# Patient Record
Sex: FEMALE | Race: WHITE
[De-identification: names, ages, dates, MRNs, and addresses within clinical notes are randomized per-mention and may not be internally consistent; named-entity substitution may affect disease eponyms.]

---

## 2018-10-17 ENCOUNTER — HOSPITAL ENCOUNTER (EMERGENCY)
Dept: HOSPITAL 76 - ED | Age: 58
Discharge: HOME | End: 2018-10-17
Payer: COMMERCIAL

## 2018-10-17 VITALS — SYSTOLIC BLOOD PRESSURE: 103 MMHG | DIASTOLIC BLOOD PRESSURE: 63 MMHG

## 2018-10-17 DIAGNOSIS — E78.00: ICD-10-CM

## 2018-10-17 DIAGNOSIS — J18.1: ICD-10-CM

## 2018-10-17 DIAGNOSIS — E86.0: ICD-10-CM

## 2018-10-17 DIAGNOSIS — I10: ICD-10-CM

## 2018-10-17 DIAGNOSIS — N30.00: Primary | ICD-10-CM

## 2018-10-17 LAB
ALBUMIN DIAFP-MCNC: 4.2 G/DL (ref 3.2–5.5)
ALBUMIN/GLOB SERPL: 1.4 {RATIO} (ref 1–2.2)
ALP SERPL-CCNC: 94 IU/L (ref 42–121)
ALT SERPL W P-5'-P-CCNC: 23 IU/L (ref 10–60)
ANION GAP SERPL CALCULATED.4IONS-SCNC: 9 MMOL/L (ref 6–13)
AST SERPL W P-5'-P-CCNC: 24 IU/L (ref 10–42)
BASOPHILS NFR BLD AUTO: 0.1 10^3/UL (ref 0–0.1)
BASOPHILS NFR BLD AUTO: 0.5 %
BILIRUB BLD-MCNC: 1.1 MG/DL (ref 0.2–1)
BUN SERPL-MCNC: 23 MG/DL (ref 6–20)
CALCIUM UR-MCNC: 9 MG/DL (ref 8.5–10.3)
CHLORIDE SERPL-SCNC: 103 MMOL/L (ref 101–111)
CLARITY UR REFRACT.AUTO: CLEAR
CO2 SERPL-SCNC: 26 MMOL/L (ref 21–32)
CREAT SERPLBLD-SCNC: 0.6 MG/DL (ref 0.4–1)
EOSINOPHIL # BLD AUTO: 0 10^3/UL (ref 0–0.7)
EOSINOPHIL NFR BLD AUTO: 0.3 %
ERYTHROCYTE [DISTWIDTH] IN BLOOD BY AUTOMATED COUNT: 13.6 % (ref 12–15)
GFRSERPLBLD MDRD-ARVRAT: 102 ML/MIN/{1.73_M2} (ref 89–?)
GLOBULIN SER-MCNC: 3 G/DL (ref 2.1–4.2)
GLUCOSE SERPL-MCNC: 118 MG/DL (ref 70–100)
GLUCOSE UR QL STRIP.AUTO: NEGATIVE MG/DL
HGB UR QL STRIP: 13.8 G/DL (ref 12–16)
KETONES UR QL STRIP.AUTO: NEGATIVE MG/DL
LIPASE SERPL-CCNC: 29 U/L (ref 22–51)
LYMPHOCYTES # SPEC AUTO: 0.3 10^3/UL (ref 1.5–3.5)
LYMPHOCYTES NFR BLD AUTO: 2 %
MCH RBC QN AUTO: 29.1 PG (ref 27–31)
MCHC RBC AUTO-ENTMCNC: 34 G/DL (ref 32–36)
MCV RBC AUTO: 85.6 FL (ref 81–99)
MONOCYTES # BLD AUTO: 0.2 10^3/UL (ref 0–1)
MONOCYTES NFR BLD AUTO: 1.3 %
MUCOUS THREADS URNS QL MICRO: (no result)
NEUTROPHILS # BLD AUTO: 12.2 10^3/UL (ref 1.5–6.6)
NEUTROPHILS # SNV AUTO: 12.7 X10^3/UL (ref 4.8–10.8)
NEUTROPHILS NFR BLD AUTO: 95.9 %
NITRITE UR QL STRIP.AUTO: POSITIVE
PDW BLD AUTO: 9.7 FL (ref 7.9–10.8)
PH UR STRIP.AUTO: 8 PH (ref 5–7.5)
PLAT MORPH BLD: (no result)
PLATELET # BLD: 197 10^3/UL (ref 130–450)
PROT SPEC-MCNC: 7.2 G/DL (ref 6.7–8.2)
PROT UR STRIP.AUTO-MCNC: NEGATIVE MG/DL
RBC # UR STRIP.AUTO: (no result) /UL
RBC # URNS HPF: (no result) /HPF (ref 0–5)
RBC MAR: 4.74 10^6/UL (ref 4.2–5.4)
SODIUM SERPLBLD-SCNC: 138 MMOL/L (ref 135–145)
SP GR UR STRIP.AUTO: 1.02 (ref 1–1.03)
SQUAMOUS URNS QL MICRO: (no result)
UROBILINOGEN UR QL STRIP.AUTO: (no result) E.U./DL
UROBILINOGEN UR STRIP.AUTO-MCNC: NEGATIVE MG/DL

## 2018-10-17 PROCEDURE — 83605 ASSAY OF LACTIC ACID: CPT

## 2018-10-17 PROCEDURE — 85025 COMPLETE CBC W/AUTO DIFF WBC: CPT

## 2018-10-17 PROCEDURE — 36415 COLL VENOUS BLD VENIPUNCTURE: CPT

## 2018-10-17 PROCEDURE — 83690 ASSAY OF LIPASE: CPT

## 2018-10-17 PROCEDURE — 81001 URINALYSIS AUTO W/SCOPE: CPT

## 2018-10-17 PROCEDURE — 81003 URINALYSIS AUTO W/O SCOPE: CPT

## 2018-10-17 PROCEDURE — 87086 URINE CULTURE/COLONY COUNT: CPT

## 2018-10-17 PROCEDURE — 71046 X-RAY EXAM CHEST 2 VIEWS: CPT

## 2018-10-17 PROCEDURE — 80053 COMPREHEN METABOLIC PANEL: CPT

## 2018-10-17 PROCEDURE — 87181 SC STD AGAR DILUTION PER AGT: CPT

## 2018-10-17 PROCEDURE — 87077 CULTURE AEROBIC IDENTIFY: CPT

## 2018-10-17 PROCEDURE — 99283 EMERGENCY DEPT VISIT LOW MDM: CPT

## 2018-10-17 PROCEDURE — 87040 BLOOD CULTURE FOR BACTERIA: CPT

## 2018-10-17 NOTE — ED PHYSICIAN DOCUMENTATION
PD HPI NVD





- Stated complaint


Stated Complaint: N/V CHILLS FEVER





- Chief complaint


Chief Complaint: Abd Pain





- History obtained from


History obtained from: Patient, Family





- History of Present Illness


Timing - onset: Today


Timing - duration: Hours


Timing - details: Abrupt onset, Still present


Associated symptoms: Fever, Dizzy, Near syncope / syncope, Loss of appetite


Improved by: Laying still


Similar symptoms before: Has not had sx before


Recently seen: Not recently seen





- Additonal information


Additional information: 





58-year-old female was well when she went to bed last night and when she woke 

this morning.  She did not have breakfast which is usual for her.  She is farm 

sitting right now and she was out tending to the animals when she had sudden 

onset of shaking chills and now has muscle aches and nausea and vomiting.  She 

has low-grade fever.  She denies any more cough than her usual morning cough 

which she coughed up a small amount of phlegm.  She denies any swelling or areas

of pain.





Review of Systems


Constitutional: reports: Fever, Chills, Myalgias, Fatigue, Sweats


Eyes: denies: Decreased vision


Ears: denies: Ear pain


Nose: denies: Rhinorrhea / runny nose, Congestion


Throat: denies: Sore throat


Cardiac: denies: Chest pain / pressure, Palpitations


Respiratory: reports: Cough.  denies: Dyspnea


GI: reports: Nausea, Vomiting.  denies: Abdominal Pain


: denies: Dysuria, Frequency


Skin: denies: Rash


Musculoskeletal: denies: Neck pain, Back pain, Extremity pain


Neurologic: reports: Generalized weakness.  denies: Focal weakness, Numbness





PD PAST MEDICAL HISTORY





- Past Medical History


Past Medical History: Yes


Cardiovascular: Hypertension, High cholesterol, Other


Respiratory: Pneumonia


GI: GERD, Other


: Chronic bladder infection, Kidney stones


Psych: Depression, Anxiety


Other Past Medical History: IBS





- Past Surgical History


Past Surgical History: Yes


General: Appendectomy


/GYN:  section, Tubal ligation





- Present Medications


Home Medications: 


                                Ambulatory Orders











 Medication  Instructions  Recorded  Confirmed


 


Levofloxacin [Levaquin] 500 mg PO DAILY #10 tablet 10/17/18 














- Allergies


Allergies/Adverse Reactions: 


                                    Allergies











Allergy/AdvReac Type Severity Reaction Status Date / Time


 


adhesive Allergy  Hives Verified 10/17/18 11:32














- Social History


Does the pt smoke?: No


Smoking Status: Never smoker


Does the pt drink ETOH?: No


Does the pt have substance abuse?: No





- Immunizations


Immunizations are current?: Yes





- POLST


Patient has POLST: No





PD ED PE NORMAL





- Vitals


Vital signs reviewed: Yes (tachy and hypertensive )





- General


General: Alert and oriented X 3, No acute distress, Well developed/nourished





- HEENT


HEENT: Atraumatic, PERRL, EOMI, Ears normal





- Neck


Neck: Supple, no meningeal sign, No bony TTP





- Cardiac


Cardiac: No murmur, Other (tachy to 110)





- Respiratory


Respiratory: No respiratory distress, Clear bilaterally





- Abdomen


Abdomen: Soft, Non tender





- Back


Back: No CVA TTP, No spinal TTP





- Derm


Derm: Normal color, Warm and dry, No rash





- Extremities


Extremities: No deformity, No edema





- Neuro


Neuro: Alert and oriented X 3, CNs 2-12 intact, No motor deficit, No sensory 

deficit, Normal speech


Eye Opening: Spontaneous


Motor: Obeys Commands


Verbal: Oriented


GCS Score: 15





- Psych


Psych: Normal mood, Normal affect





Results





- Vitals


Vitals: 


                               Vital Signs - 24 hr











  10/17/18 10/17/18 10/17/18





  11:30 15:06 15:23


 


Temperature 37.0 C 38.1 C H 


 


Heart Rate 107 H 93 


 


Respiratory 16 18 





Rate   


 


Blood Pressure 136/75 H 103/63 


 


O2 Saturation 97 94 97








                                     Oxygen











O2 Source                      Room air

















- Labs


Labs: 


                                Laboratory Tests











  10/17/18 10/17/18 10/17/18





  12:10 12:10 12:15


 


WBC  12.7 H  


 


RBC  4.74  


 


Hgb  13.8  


 


Hct  40.6  


 


MCV  85.6  


 


MCH  29.1  


 


MCHC  34.0  


 


RDW  13.6  


 


Plt Count  197  


 


MPV  9.7  


 


Neut # (Auto)  12.2 H  


 


Lymph # (Auto)  0.3 L  


 


Mono # (Auto)  0.2  


 


Eos # (Auto)  0.0  


 


Baso # (Auto)  0.1  


 


Absolute Nucleated RBC  0.01  


 


Total Counted  NP  


 


Band Neuts % (Manual)  NP  


 


Abnorm Lymph % (Manual)  NP  


 


Nucleated RBC %  0.1  


 


Neutrophils # (Manual)  NP  


 


Lymphocytes # (Manual)  NP  


 


Monocytes # (Manual)  NP  


 


Eosinophils # (Manual)  NP  


 


Basophils # (Manual)  NP  


 


Platelet Morphology  A  


 


Sodium   138 


 


Potassium   3.1 L 


 


Chloride   103 


 


Carbon Dioxide   26 


 


Anion Gap   9.0 


 


BUN   23 H 


 


Creatinine   0.6 


 


Estimated GFR (MDRD)   102 


 


Glucose   118 H 


 


Lactic Acid    2.0


 


Calcium   9.0 


 


Total Bilirubin   1.1 H 


 


AST   24 


 


ALT   23 


 


Alkaline Phosphatase   94 


 


Total Protein   7.2 


 


Albumin   4.2 


 


Globulin   3.0 


 


Albumin/Globulin Ratio   1.4 


 


Lipase   29 


 


Urine Color   


 


Urine Clarity   


 


Urine pH   


 


Ur Specific Gravity   


 


Urine Protein   


 


Urine Glucose (UA)   


 


Urine Ketones   


 


Urine Occult Blood   


 


Urine Nitrite   


 


Urine Bilirubin   


 


Urine Urobilinogen   


 


Ur Leukocyte Esterase   


 


Urine RBC   


 


Urine WBC   


 


Ur Squamous Epith Cells   


 


Urine Bacteria   


 


Urine Mucus   


 


Ur Microscopic Review   


 


Urine Culture Comments   














  10/17/18





  12:21


 


WBC 


 


RBC 


 


Hgb 


 


Hct 


 


MCV 


 


MCH 


 


MCHC 


 


RDW 


 


Plt Count 


 


MPV 


 


Neut # (Auto) 


 


Lymph # (Auto) 


 


Mono # (Auto) 


 


Eos # (Auto) 


 


Baso # (Auto) 


 


Absolute Nucleated RBC 


 


Total Counted 


 


Band Neuts % (Manual) 


 


Abnorm Lymph % (Manual) 


 


Nucleated RBC % 


 


Neutrophils # (Manual) 


 


Lymphocytes # (Manual) 


 


Monocytes # (Manual) 


 


Eosinophils # (Manual) 


 


Basophils # (Manual) 


 


Platelet Morphology 


 


Sodium 


 


Potassium 


 


Chloride 


 


Carbon Dioxide 


 


Anion Gap 


 


BUN 


 


Creatinine 


 


Estimated GFR (MDRD) 


 


Glucose 


 


Lactic Acid 


 


Calcium 


 


Total Bilirubin 


 


AST 


 


ALT 


 


Alkaline Phosphatase 


 


Total Protein 


 


Albumin 


 


Globulin 


 


Albumin/Globulin Ratio 


 


Lipase 


 


Urine Color  YELLOW


 


Urine Clarity  CLEAR


 


Urine pH  8.0 H


 


Ur Specific Gravity  1.020


 


Urine Protein  NEGATIVE


 


Urine Glucose (UA)  NEGATIVE


 


Urine Ketones  NEGATIVE


 


Urine Occult Blood  TRACE-INTA


 


Urine Nitrite  POSITIVE H


 


Urine Bilirubin  NEGATIVE


 


Urine Urobilinogen  0.2 (NORMAL)


 


Ur Leukocyte Esterase  SMALL H


 


Urine RBC  0-5


 


Urine WBC  >25 H


 


Ur Squamous Epith Cells  RARE Squamous


 


Urine Bacteria  Moderate H


 


Urine Mucus  Few Strands


 


Ur Microscopic Review  INDICATED


 


Urine Culture Comments  INDICATED














- Rads (name of study)


  ** 2 veiw chest


Radiology: Prelim report reviewed (Impression: Mild nodular right lung base 

opacities could represent bronchopneumonia.  Probable scar or atelectasis in the

 lingula), EMP read indepedently, See rad report





Procedures





- IVC sono (time)


  ** 1150


Bedside IVC sono: IVC measures (cm) (1.14), IVC collapsed c insp (cm) 

(complete), Dehydration (est 1liter deficit)





PD MEDICAL DECISION MAKING





- ED course


Complexity details: reviewed old records, reviewed results, re-evaluated 

patient, considered differential, d/w patient, d/w family


ED course: 





57 y/o female with acute febrile illness has evidence of infection on plain film

 of the chest was well as exam of the urine under the microscope. She is treated

 in the ED with IV saline and rocephin and is given potassium for a K+ of 3.1. 





Departure





- Departure


Disposition: 01 Home, Self Care


Clinical Impression: 


Urinary tract infection


Qualifiers:


 Urinary tract infection type: acute cystitis Hematuria presence: without 

hematuria Qualified Code(s): N30.00 - Acute cystitis without hematuria





Pneumonia


Qualifiers:


 Pneumonia type: due to unspecified organism Laterality: right Lung location: 

lower lobe of lung Qualified Code(s): J18.1 - Lobar pneumonia, unspecified 

organism





Condition: Stable


Instructions:  ED UTI Cystitis Female, ED Pneumonia Adult


Follow-Up: 


Ayanna Das PA [Primary Care Provider] - 


Prescriptions: 


Levofloxacin [Levaquin] 500 mg PO DAILY #10 tablet

## 2018-10-17 NOTE — XRAY REPORT
Reason:  cough fever chills

Procedure Date:  10/17/2018   

Accession Number:  [id number] / [id number]                    

Procedure:  XR  - Chest 2 View X-Ray CPT Code:  16721

 

FULL RESULT:

 

 

EXAM:

CHEST RADIOGRAPHY

 

EXAM DATE: 10/17/2018 01:16 PM.

 

CLINICAL HISTORY: Cough fever chills.

 

COMPARISON: None.

 

TECHNIQUE: 2 views.

 

FINDINGS:

Lungs/Pleura: Mild nodular opacities in the right lung base, likely in 

the right lower lobe. Probable streaky atelectasis or scar in the 

lingula. No pleural effusion. No pneumothorax. Borderline hyperinflation.

 

Mediastinum: Heart and mediastinal contours are normal.

 

Other: None.

IMPRESSION: Mild nodular right lung base opacities could represent 

bronchopneumonia. Probable scar or atelectasis in the lingula.

 

RADIA

## 2018-10-18 ENCOUNTER — HOSPITAL ENCOUNTER (INPATIENT)
Dept: HOSPITAL 76 - ED | Age: 58
LOS: 3 days | Discharge: HOME | DRG: 871 | End: 2018-10-21
Attending: INTERNAL MEDICINE | Admitting: INTERNAL MEDICINE
Payer: COMMERCIAL

## 2018-10-18 DIAGNOSIS — Z79.1: ICD-10-CM

## 2018-10-18 DIAGNOSIS — G89.29: ICD-10-CM

## 2018-10-18 DIAGNOSIS — M54.9: ICD-10-CM

## 2018-10-18 DIAGNOSIS — T36.95XA: ICD-10-CM

## 2018-10-18 DIAGNOSIS — G47.33: ICD-10-CM

## 2018-10-18 DIAGNOSIS — K52.1: ICD-10-CM

## 2018-10-18 DIAGNOSIS — R78.81: Primary | ICD-10-CM

## 2018-10-18 DIAGNOSIS — Z79.899: ICD-10-CM

## 2018-10-18 DIAGNOSIS — J18.1: ICD-10-CM

## 2018-10-18 DIAGNOSIS — N10: ICD-10-CM
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
## 2018-10-18 DIAGNOSIS — N20.0: ICD-10-CM

## 2018-10-18 DIAGNOSIS — B96.1: ICD-10-CM

## 2018-10-18 DIAGNOSIS — E87.6: ICD-10-CM

## 2018-10-18 DIAGNOSIS — I10: ICD-10-CM

## 2018-10-18 DIAGNOSIS — N30.00: ICD-10-CM

## 2018-10-18 DIAGNOSIS — E78.5: ICD-10-CM

## 2018-10-18 DIAGNOSIS — E86.0: ICD-10-CM

## 2018-10-18 LAB
ALBUMIN DIAFP-MCNC: 3.4 G/DL (ref 3.2–5.5)
ALBUMIN/GLOB SERPL: 1.3 {RATIO} (ref 1–2.2)
ALP SERPL-CCNC: 73 IU/L (ref 42–121)
ALT SERPL W P-5'-P-CCNC: 24 IU/L (ref 10–60)
ANION GAP SERPL CALCULATED.4IONS-SCNC: 8 MMOL/L (ref 6–13)
AST SERPL W P-5'-P-CCNC: 25 IU/L (ref 10–42)
BASOPHILS NFR BLD AUTO: 0.1 10^3/UL (ref 0–0.1)
BASOPHILS NFR BLD AUTO: 0.7 %
BILIRUB BLD-MCNC: 0.7 MG/DL (ref 0.2–1)
BUN SERPL-MCNC: 16 MG/DL (ref 6–20)
CALCIUM UR-MCNC: 8.5 MG/DL (ref 8.5–10.3)
CHLORIDE SERPL-SCNC: 106 MMOL/L (ref 101–111)
CO2 SERPL-SCNC: 25 MMOL/L (ref 21–32)
CREAT SERPLBLD-SCNC: 0.5 MG/DL (ref 0.4–1)
EOSINOPHIL # BLD AUTO: 0 10^3/UL (ref 0–0.7)
EOSINOPHIL NFR BLD AUTO: 0.3 %
ERYTHROCYTE [DISTWIDTH] IN BLOOD BY AUTOMATED COUNT: 13.9 % (ref 12–15)
GFRSERPLBLD MDRD-ARVRAT: 126 ML/MIN/{1.73_M2} (ref 89–?)
GLOBULIN SER-MCNC: 2.7 G/DL (ref 2.1–4.2)
GLUCOSE SERPL-MCNC: 110 MG/DL (ref 70–100)
HGB UR QL STRIP: 12.3 G/DL (ref 12–16)
LYMPHOCYTES # SPEC AUTO: 0.4 10^3/UL (ref 1.5–3.5)
LYMPHOCYTES NFR BLD AUTO: 4.9 %
MCH RBC QN AUTO: 29.8 PG (ref 27–31)
MCHC RBC AUTO-ENTMCNC: 34.4 G/DL (ref 32–36)
MCV RBC AUTO: 86.5 FL (ref 81–99)
MONOCYTES # BLD AUTO: 0.4 10^3/UL (ref 0–1)
MONOCYTES NFR BLD AUTO: 4.8 %
NEUTROPHILS # BLD AUTO: 7.3 10^3/UL (ref 1.5–6.6)
NEUTROPHILS # SNV AUTO: 8.2 X10^3/UL (ref 4.8–10.8)
NEUTROPHILS NFR BLD AUTO: 89.3 %
PDW BLD AUTO: 9.2 FL (ref 7.9–10.8)
PLATELET # BLD: 141 10^3/UL (ref 130–450)
PROT SPEC-MCNC: 6.1 G/DL (ref 6.7–8.2)
RBC MAR: 4.13 10^6/UL (ref 4.2–5.4)
SODIUM SERPLBLD-SCNC: 139 MMOL/L (ref 135–145)

## 2018-10-18 PROCEDURE — 87181 SC STD AGAR DILUTION PER AGT: CPT

## 2018-10-18 PROCEDURE — 87150 DNA/RNA AMPLIFIED PROBE: CPT

## 2018-10-18 PROCEDURE — 87077 CULTURE AEROBIC IDENTIFY: CPT

## 2018-10-18 PROCEDURE — 83690 ASSAY OF LIPASE: CPT

## 2018-10-18 PROCEDURE — 85025 COMPLETE CBC W/AUTO DIFF WBC: CPT

## 2018-10-18 PROCEDURE — 99283 EMERGENCY DEPT VISIT LOW MDM: CPT

## 2018-10-18 PROCEDURE — 96365 THER/PROPH/DIAG IV INF INIT: CPT

## 2018-10-18 PROCEDURE — 71046 X-RAY EXAM CHEST 2 VIEWS: CPT

## 2018-10-18 PROCEDURE — 99284 EMERGENCY DEPT VISIT MOD MDM: CPT

## 2018-10-18 PROCEDURE — 83605 ASSAY OF LACTIC ACID: CPT

## 2018-10-18 PROCEDURE — 71045 X-RAY EXAM CHEST 1 VIEW: CPT

## 2018-10-18 PROCEDURE — 36415 COLL VENOUS BLD VENIPUNCTURE: CPT

## 2018-10-18 PROCEDURE — 80053 COMPREHEN METABOLIC PANEL: CPT

## 2018-10-18 PROCEDURE — 81003 URINALYSIS AUTO W/O SCOPE: CPT

## 2018-10-18 PROCEDURE — 87086 URINE CULTURE/COLONY COUNT: CPT

## 2018-10-18 PROCEDURE — 74176 CT ABD & PELVIS W/O CONTRAST: CPT

## 2018-10-18 PROCEDURE — 87040 BLOOD CULTURE FOR BACTERIA: CPT

## 2018-10-18 PROCEDURE — 81001 URINALYSIS AUTO W/SCOPE: CPT

## 2018-10-18 RX ADMIN — ONDANSETRON PRN MG: 2 INJECTION INTRAMUSCULAR; INTRAVENOUS at 03:58

## 2018-10-18 RX ADMIN — METOPROLOL TARTRATE SCH MG: 50 TABLET, FILM COATED ORAL at 21:34

## 2018-10-18 RX ADMIN — SODIUM CHLORIDE, PRESERVATIVE FREE SCH ML: 5 INJECTION INTRAVENOUS at 08:57

## 2018-10-18 RX ADMIN — ENOXAPARIN SODIUM SCH MG: 100 INJECTION SUBCUTANEOUS at 09:09

## 2018-10-18 RX ADMIN — SODIUM CHLORIDE, PRESERVATIVE FREE SCH: 5 INJECTION INTRAVENOUS at 03:55

## 2018-10-18 RX ADMIN — Medication SCH MG: at 08:55

## 2018-10-18 RX ADMIN — OXYCODONE PRN MG: 5 TABLET ORAL at 16:08

## 2018-10-18 RX ADMIN — Medication SCH MG: at 17:20

## 2018-10-18 RX ADMIN — POLYETHYLENE GLYCOL 3350 SCH: 17 POWDER, FOR SOLUTION ORAL at 08:57

## 2018-10-18 RX ADMIN — FAMOTIDINE SCH MG: 20 TABLET, FILM COATED ORAL at 21:34

## 2018-10-18 RX ADMIN — OXYCODONE PRN MG: 5 TABLET ORAL at 03:46

## 2018-10-18 RX ADMIN — ACETAMINOPHEN PRN MG: 325 TABLET ORAL at 03:46

## 2018-10-18 RX ADMIN — SODIUM CHLORIDE SCH MLS/HR: 9 INJECTION, SOLUTION INTRAVENOUS at 03:46

## 2018-10-18 RX ADMIN — SODIUM CHLORIDE AND POTASSIUM CHLORIDE SCH: 9; 1.49 INJECTION, SOLUTION INTRAVENOUS at 17:17

## 2018-10-18 RX ADMIN — ATORVASTATIN CALCIUM SCH MG: 40 TABLET, FILM COATED ORAL at 21:35

## 2018-10-18 RX ADMIN — SODIUM CHLORIDE AND POTASSIUM CHLORIDE SCH MLS/HR: 9; 1.49 INJECTION, SOLUTION INTRAVENOUS at 03:38

## 2018-10-18 RX ADMIN — METOPROLOL TARTRATE SCH MG: 50 TABLET, FILM COATED ORAL at 08:56

## 2018-10-18 RX ADMIN — MELOXICAM SCH MG: 7.5 TABLET ORAL at 08:56

## 2018-10-18 RX ADMIN — FAMOTIDINE SCH MG: 20 TABLET, FILM COATED ORAL at 08:56

## 2018-10-18 RX ADMIN — SODIUM CHLORIDE, PRESERVATIVE FREE SCH ML: 5 INJECTION INTRAVENOUS at 17:20

## 2018-10-18 RX ADMIN — ACETAMINOPHEN PRN MG: 325 TABLET ORAL at 16:08

## 2018-10-18 NOTE — ED PHYSICIAN DOCUMENTATION
ED Addendum





- Addendum


Addendum: 





10/18/1The patient was seen earlier in the day and we are getting a preliminary 

blood culture results off of both sets of blood cultures with gram-negative rods

(she was diagnosed with UTI).  The patient was called to return to the ER for 

hospitalization due to bacteremia.8 00:16

## 2018-10-18 NOTE — HISTORY & PHYSICAL EXAMINATION
Chief Complaint





- Chief Complaint


Chief Complaint: Nausea, vomiting, fever and chills





History of Present Illness





- Admitted From


Admitted From:: Emergency Department





- History Obtained From


Records Reviewed: Yes


History obtained from: Patient


Exam Limitations: None





- History of Present Illness


HPI Comment/Other: 





Patient is a 58-year-old female with a past medical history significant for 

hypertension, hyperlipidemia, obstructive sleep apnea on CPAP, history of kidney

stones and herniated disc in her spine who presented to the emergency department

with a chief complaint of nausea, vomiting, chills and fever.  The patient 

states that she was in her normal state of health until late last week when she 

began to develop urinary symptoms.  She states that she was having increased 

urinary frequency, urinary urgency and noticed that she was having cloudy urine.

 The patient however denies any dysuria or abdominal or flank pain.  She states 

that she did notice that she was having some palpitations off and on without any

shortness of breath.  She did not see a doctor or take any antibiotics.  She 

states that around 8 AM this morning she became very nauseated.  She states that

she then started having dry heaves which continued to get worse and developed 

fever and chills.  She states that her nausea was uncontrolled and she just 

continued to feel worse until she ended up calling her daughter to bring her 

into the emergency department.  The patient also states that she has had a poor 

appetite throughout the day today and has not eaten anything today.





Patient denies any headaches, blurred vision, runny nose, sore throat, nasal 

congestion, difficulty swallowing, chest pain, shortness of air, orthopnea, PND,

increased lower extremity swelling, abdominal pain, diarrhea, constipation, 

joint swelling, muscle aches, back pain, neck stiffness, recent unintentional 

weight loss, hair loss, night sweats, polyuria, polydipsia, skin changes, 

insomnia or any focal neurologic deficits.





On presentation to the emergency department this morning the patient was febrile

with a temperature of 38.1, tachycardic with a heart rate of 107 and borderline 

hypotensive with a blood pressure of 103/63.  The patient's lab work revealed a 

white blood cell count that was elevated at 12.7 and a mild hypokalemia.  

Patient's lactic acid was 2.0.  The patient's urine analysis was positive with 

nitrite, small leukocyte esterase, greater than 25 WBCs and moderate bacteria.  

The patient also underwent a chest x-ray which revealed a mild nodular right 

lung base opacity which could represent broncho-pneumonia.  The patient was 

diagnosed with a urinary tract infection and community acquired pneumonia.  She 

was given a dose of IV ceftriaxone and discharged home with oral Levaquin.  

Later this evening the emergency room physician received the results from the 

patient's blood cultures from earlier in the day and they were growing gram-

negative bacilli.  The emergency room physician called the patient to come back 

to the emergency department so that she could be admitted for bacteremia.  The 

patient returned and had normal vital signs and seemed to be feeling better but 

was admitted to the medical [facility] for further IV antibiotics and repeat blood 

cultures.





History





- Past Medical History


Cardiovascular: reports: Hypertension, High cholesterol, Other


Respiratory: reports: Pneumonia


GI: reports: GERD, Other


: reports: Chronic bladder infection, Kidney stones


Psych: reports: Depression, Anxiety


Musculoskeletal: reports: Chronic back pain


MRSA Hx?: No





- Past Surgical History


General: reports: Appendectomy


/GYN: reports:  section, Tubal ligation





- Family & Social History


Family History: Father: CAD, Hypertension, Brother: CAD, MI (At age 55), Other 

family: Diabetes, Type 1 (3 of her children have type 1 diabetes)


Living arrangement: At home


Living Situation: With family


Social History Notes: The patient lives in [place] with her , one son 

and 1 daughter.  The live on a farm and have sheep and other animals.  The 

patient is originally from [place] and moved to [facility] when her  

was deployed here in the [employer].  She has been living here for more than 20 years.

 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  She denies any tobacco use, she rarely drinks alcohol

and denies any illicit drug use.





- POLST


Patient has POLST: No


POLST Status: Full Code





Meds/Allgy





- Home Medications


Home Medications: 


                                Ambulatory Orders











 Medication  Instructions  Recorded  Confirmed


 


Levofloxacin [Levaquin] 500 mg PO DAILY #10 tablet 10/17/18 














- Allergies


Allergies/Adverse Reactions: 


                                    Allergies











Allergy/AdvReac Type Severity Reaction Status Date / Time


 


adhesive Allergy  Hives Verified 10/18/18 00:21














Review of Systems





- Other Findings


Other Findings: 





A comprehensive review of systems was performed the pertinent positives and 

negatives are stated above in the HPI and the remainder of the review of systems

 is negative.


Prior Level of Functionality: 





Patient is completely independent with all her activities of daily living.





Exam





- Vital Signs


Reviewed Vital Signs: Yes


Vital Signs: 





                                Vital Signs x48h











  Temp Pulse Resp BP Pulse Ox


 


 10/18/18 00:18  36.6 C  84  18  135/76 H  96














- Physical Exam


General Appearance: positive: No acute distress, Alert


Eyes Bilateral: positive: Normal inspection, PERRL, EOMI, No lid inflammation, 

Conjunctivae nml, No scleral icterus


ENT: positive: ENT inspection nml, Pharynx nml, Dry mucous membranes.  negative:

 Purulent nasal drainage, Pharyngeal erythema, Oral lesions


Neck: positive: Nml inspection, Thyroid nml, No JVD, Trachea midline.  negative:

 Thyromegaly, Lymphadenopathy (R), Lymphadenopathy (L), Carotid bruit, Tracheal 

deviation


Respiratory: positive: Chest non-tender, No respiratory distress, Breath sounds 

nml.  negative: Wheezes, Rales, Rhonchi


Cardiovascular: positive: Regular rate & rhythm, No murmur, No gallop


Peripheral Pulses: positive: 2+


Abdomen: positive: Non-tender, No organomegaly, Nml bowel sounds, No distention.

  negative: Guarding, Rebound, Hepatomegaly


Back: positive: Nml inspection.  negative: CVA tenderness (R), CVA tenderness 

(L)


Skin: positive: Color nml, No rash, Warm, Dry.  negative: Cyanosis, Diaphoresis,

 Pallor


Extremities: positive: Non-tender, Full ROM, Nml appearance, No pedal edema


Neurologic/Psychiatric: positive: Oriented x3, CN's nml (2-12), Motor nml, 

Sensation nml, Mood/affect nml





Conclusion/Plan





- Problem List


(1) Gram-negative bacteremia


Conclusion/Plan: 


The patient presented to the emergency department earlier today with complaint 

of nausea, dry heaves, chills and fever.  The symptoms were getting worse 

throughout the morning to the point where patient could no longer stay at home. 

 The patient also admitted to increased urinary frequency, urgency and cloudy 

urine since last week.  On presentation the patient was febrile, tachycardic and

 had a leukocytosis.  Patient's urinalysis was grossly positive for a urinary 

tract infection.  Patient was also found to have infiltrate on chest x-ray 

concerning for pneumonia.  Patient was treated with IV antibiotics and sent home

 but was called back when her blood cultures came back negative for gram-

negative bacilli.





Plan:


Patient is being admitted for gram-negative bacilli bacteremia


IV ceftriaxone and azithromycin


Repeat blood cultures 24 hours after initial blood culture


Await susceptibilities


IV fluids


The patient will need 14 days of antibiotic treatment








(2) Urinary tract infection


Conclusion/Plan: 


The patient presented with urinary urgency, urinary frequency and cloudy urine 

for over a week.  She was beginning to have worsening symptoms of fevers, chills

 and nausea and vomiting.  The patient had a positive UA concerning for urinary 

tract infection.  Patient was treated with IV antibiotics and sent home but 

returns with positive blood cultures for gram-negative bacilli.





Plan:


IV ceftriaxone for treatment of UTI with bacteremia


Follow-up urine and blood cultures


IV fluids


If patient remains afebrile for greater than 24 hours and appears to be 

improving she may be able to be switched to oral antibiotics depending on 

susceptibilities


Patient will need 14 days of treatment for treatment of bacteremia.


Qualifiers: 


   Urinary tract infection type: acute pyelonephritis   Qualified Code(s): N10 -

 Acute pyelonephritis   





(3) CAP (community acquired pneumonia)


Conclusion/Plan: 


The patient presented with fever, tachycardia and elevated white blood cell 

count.  The patient was found to have a urinary tract infection.  The patient 

also underwent a chest x-ray which did show a right base pneumonia.  The patient

 was initially treated with IV antibiotics in the emergency department and sent 

home with p.ana Washburn.  However the patient's blood cultures are growing gram-

negative bacilli and she was called back to the emergency department and will be

 admitted for bacteremia.





Plan:


IV ceftriaxone azithromycin


Follow-up blood cultures


IV fluids


Supplemental oxygen as needed


Qualifiers: 


   Laterality: right 





(4) Hypokalemia


Conclusion/Plan: 


The patient had hypokalemia on presentation with a potassium of 3.1.  This is 

likely secondary to her nausea and vomiting earlier this morning.  The patient 

will be given potassium replacement with her IV fluid.  We will continue to 

monitor the patient's potassium daily.








(5) Hypertension


Conclusion/Plan: 


Patient is a history of hypertension and is on metoprolol at home.  The patient 

takes 100 mg of metoprolol in the morning and 50 mill grams at night.  We will 

continue the patient on her home metoprolol dose and continue to monitor her 

blood pressure and titrate medication as needed.


Qualifiers: 


   Hypertension type: essential hypertension   Qualified Code(s): I10 - 

Essential (primary) hypertension   





(6) Hyperlipidemia


Conclusion/Plan: 


Patient has a history of hyperlipidemia and is on a statin at home.  The patient

 will be continued on her statin while she is hospitalized.


Qualifiers: 


   Hyperlipidemia type: unspecified   Qualified Code(s): E78.5 - Hyperlipidemia,

 unspecified   





(7) CARMELLA on CPAP


Conclusion/Plan: 


Patient has a history of obstructive sleep apnea on CPAP.  She is not compliant 

with her CPAP at home.  The patient will be allowed to bring in her home CPAP 

and use it if she desires.








(8) Chronic back pain


Conclusion/Plan: 


The patient has chronic back pain secondary to a herniated disc.  The patient 

takes meloxicam daily at home.  We will continue the patient on meloxicam while 

she is hospitalized here.  Patient will also be able to get Tylenol and 

oxycodone if she needs it.


Qualifiers: 


   Back pain location: back pain in unspecified location 





- Lab Results


Lab results reviewed: Yes


Other Lab Results: 





WBC 12.7


Potassium 3.1


BUN 23


Glucose 118


Total bilirubin 1.1


Lactate 2.0





- Diagnostic Imaging Results


Diagnostic Imaging Results: positive: Final report reviewed


Diagnostic Imaging Results Comments: 





Chest x-ray


Impression:


Mild nodular right lung base opacities could represent bronchopneumonia.  

Probable scar or atelectasis in the lingula.





Core Measures





- Anticipated LOS


I expect patient to be DC'd or transferred within 96 hours.: Yes





- DVT/VTE - Prophylaxis


VTE/DVT Prophylaxis med ordered at admit?: Yes

## 2018-10-19 LAB
ALBUMIN DIAFP-MCNC: 3.3 G/DL (ref 3.2–5.5)
ALBUMIN/GLOB SERPL: 1.2 {RATIO} (ref 1–2.2)
ALP SERPL-CCNC: 81 IU/L (ref 42–121)
ALT SERPL W P-5'-P-CCNC: 27 IU/L (ref 10–60)
ANION GAP SERPL CALCULATED.4IONS-SCNC: 7 MMOL/L (ref 6–13)
AST SERPL W P-5'-P-CCNC: 23 IU/L (ref 10–42)
BASOPHILS NFR BLD AUTO: 0.1 10^3/UL (ref 0–0.1)
BASOPHILS NFR BLD AUTO: 1.4 %
BILIRUB BLD-MCNC: 0.3 MG/DL (ref 0.2–1)
BUN SERPL-MCNC: 14 MG/DL (ref 6–20)
CALCIUM UR-MCNC: 8.8 MG/DL (ref 8.5–10.3)
CHLORIDE SERPL-SCNC: 109 MMOL/L (ref 101–111)
CO2 SERPL-SCNC: 23 MMOL/L (ref 21–32)
CREAT SERPLBLD-SCNC: 0.5 MG/DL (ref 0.4–1)
EOSINOPHIL # BLD AUTO: 0.1 10^3/UL (ref 0–0.7)
EOSINOPHIL NFR BLD AUTO: 1.3 %
ERYTHROCYTE [DISTWIDTH] IN BLOOD BY AUTOMATED COUNT: 13.6 % (ref 12–15)
GFRSERPLBLD MDRD-ARVRAT: 126 ML/MIN/{1.73_M2} (ref 89–?)
GLOBULIN SER-MCNC: 2.7 G/DL (ref 2.1–4.2)
GLUCOSE SERPL-MCNC: 127 MG/DL (ref 70–100)
HGB UR QL STRIP: 12.1 G/DL (ref 12–16)
LYMPHOCYTES # SPEC AUTO: 0.6 10^3/UL (ref 1.5–3.5)
LYMPHOCYTES NFR BLD AUTO: 8.6 %
MCH RBC QN AUTO: 29.6 PG (ref 27–31)
MCHC RBC AUTO-ENTMCNC: 33.8 G/DL (ref 32–36)
MCV RBC AUTO: 87.7 FL (ref 81–99)
MONOCYTES # BLD AUTO: 0.5 10^3/UL (ref 0–1)
MONOCYTES NFR BLD AUTO: 7.2 %
NEUTROPHILS # BLD AUTO: 5.9 10^3/UL (ref 1.5–6.6)
NEUTROPHILS # SNV AUTO: 7.2 X10^3/UL (ref 4.8–10.8)
NEUTROPHILS NFR BLD AUTO: 81.5 %
PDW BLD AUTO: 9.7 FL (ref 7.9–10.8)
PLATELET # BLD: 153 10^3/UL (ref 130–450)
PROT SPEC-MCNC: 6 G/DL (ref 6.7–8.2)
RBC MAR: 4.09 10^6/UL (ref 4.2–5.4)
SODIUM SERPLBLD-SCNC: 139 MMOL/L (ref 135–145)

## 2018-10-19 RX ADMIN — SODIUM CHLORIDE, PRESERVATIVE FREE PRN ML: 5 INJECTION INTRAVENOUS at 01:43

## 2018-10-19 RX ADMIN — ENOXAPARIN SODIUM SCH MG: 100 INJECTION SUBCUTANEOUS at 08:36

## 2018-10-19 RX ADMIN — SODIUM CHLORIDE, PRESERVATIVE FREE SCH ML: 5 INJECTION INTRAVENOUS at 08:40

## 2018-10-19 RX ADMIN — SODIUM CHLORIDE, PRESERVATIVE FREE SCH ML: 5 INJECTION INTRAVENOUS at 00:18

## 2018-10-19 RX ADMIN — POLYETHYLENE GLYCOL 3350 SCH GM: 17 POWDER, FOR SOLUTION ORAL at 08:36

## 2018-10-19 RX ADMIN — OXYCODONE PRN MG: 5 TABLET ORAL at 01:53

## 2018-10-19 RX ADMIN — METOPROLOL TARTRATE SCH MG: 50 TABLET, FILM COATED ORAL at 08:34

## 2018-10-19 RX ADMIN — ATORVASTATIN CALCIUM SCH MG: 40 TABLET, FILM COATED ORAL at 21:28

## 2018-10-19 RX ADMIN — Medication SCH MG: at 08:33

## 2018-10-19 RX ADMIN — SODIUM CHLORIDE SCH MLS/HR: 9 INJECTION, SOLUTION INTRAVENOUS at 01:34

## 2018-10-19 RX ADMIN — ONDANSETRON PRN MG: 2 INJECTION INTRAMUSCULAR; INTRAVENOUS at 01:28

## 2018-10-19 RX ADMIN — SODIUM CHLORIDE, PRESERVATIVE FREE SCH ML: 5 INJECTION INTRAVENOUS at 17:02

## 2018-10-19 RX ADMIN — METOPROLOL TARTRATE SCH MG: 50 TABLET, FILM COATED ORAL at 21:27

## 2018-10-19 RX ADMIN — ACETAMINOPHEN PRN MG: 325 TABLET ORAL at 15:39

## 2018-10-19 RX ADMIN — ACETAMINOPHEN PRN MG: 325 TABLET ORAL at 01:50

## 2018-10-19 RX ADMIN — Medication SCH MG: at 17:02

## 2018-10-19 RX ADMIN — SODIUM CHLORIDE SCH MLS/HR: 900 INJECTION INTRAVENOUS at 00:30

## 2018-10-19 RX ADMIN — MELOXICAM SCH MG: 7.5 TABLET ORAL at 08:34

## 2018-10-19 RX ADMIN — SODIUM CHLORIDE, PRESERVATIVE FREE SCH ML: 5 INJECTION INTRAVENOUS at 01:28

## 2018-10-19 RX ADMIN — FAMOTIDINE SCH MG: 20 TABLET, FILM COATED ORAL at 21:28

## 2018-10-19 RX ADMIN — FAMOTIDINE SCH MG: 20 TABLET, FILM COATED ORAL at 08:34

## 2018-10-19 NOTE — PROVIDER PROGRESS NOTE
Assessment/Plan





- Problem List


(1) Bacteremia due to Klebsiella pneumoniae


Assessment/Plan: 


The ID from blood and urine cultures show the same Klebsiella organism with same

susceptibilities.


Will plan 48 hours of iv antibiotic then transition to po antibiotic for a 14 

day total course.








(2) Urinary tract infection


Qualifiers: 


   Urinary tract infection type: acute pyelonephritis   Qualified Code(s): N10 -

Acute pyelonephritis   


Assessment/Plan: 


Continue iv Ceftriaxone for 48 hours, then transition to po antibiotic.


She will need to get a dose of po antibiotic here, to determine if sjhe needs to

be sent home with antiemetics.








(3) N&V (nausea and vomiting)


Assessment/Plan: 


She is nauseated to eevery iv antibiotic dose and needs antiemetic.


She will need to get a dose of po antibiotic here, to determine if she needs to 

be sent home with antiemetics.








(4) Hypertension


Qualifiers: 


   Hypertension type: essential hypertension   Qualified Code(s): I10 - 

Essential (primary) hypertension   


Assessment/Plan: 


Continue her home BP meds while here.








(5) CARMELLA on CPAP


Assessment/Plan: 


Pt was ordered to use her CPAP device while here.








(6) CAP (community acquired pneumonia)


Qualifiers: 


   Laterality: right 


Assessment/Plan: 


The ER CXR from 10/17/18 was suspicious for CAP.


The Zithromax and Ceftriaxine have started to treat that as well.


She has made no sputum to obtain a culture.


Will recheck a CXR in am. 








- Current Meds


Current Meds: 





                               Current Medications











Generic Name Dose Route Start Last Admin





  Trade Name Freq  PRN Reason Stop Dose Admin


 


Acetaminophen  650 mg  10/18/18 00:41  10/19/18 15:39





  Tylenol  PO   650 mg





  Q4HR PRN   Administration





  Pain 1 to 4   





     





     





     


 


Atorvastatin Calcium  20 mg  10/18/18 21:00  10/18/18 21:35





  Lipitor  PO   20 mg





  QPM LINCOLN   Administration





     





     





     





     


 


Enoxaparin Sodium  40 mg  10/18/18 09:00  10/19/18 08:36





  Lovenox  SUBQ   40 mg





  DAILY LINCOLN   Administration





     





     





     





     


 


Famotidine  20 mg  10/18/18 09:00  10/19/18 08:34





  Pepcid  PO   20 mg





  BID LINCOLN   Administration





     





     





     





     


 


Ceftriaxone Sodium 2 gm/  100 mls @ 200 mls/hr  10/19/18 01:00  10/19/18 01:10





  Sodium Chloride  IV   Infused





  Q24H LINCOLN   Infusion





     





     





     





     


 


Meloxicam  7.5 mg  10/18/18 09:00  10/19/18 08:34





  Mobic  PO   7.5 mg





  DAILY LINCOLN   Administration





     





     





     





     


 


Metoprolol Tartrate  50 mg  10/18/18 21:00  10/18/18 21:34





  Lopressor  PO   50 mg





  2100 LINCOLN   Administration





     





     





     





     


 


Metoprolol Tartrate  100 mg  10/18/18 09:00  10/19/18 08:34





  Lopressor  PO   100 mg





  DAILY LINCOLN   Administration





     





     





     





     


 


Ondansetron HCl  4 mg  10/18/18 00:41  10/19/18 01:28





  Zofran Inj  IVP   4 mg





  Q6HR PRN   Administration





  Nausea / Vomiting   





     





     





     


 


Oxycodone HCl  5 mg  10/18/18 00:41  10/19/18 01:53





  Roxicodone  PO   5 mg





  Q4HR PRN   Administration





  Pain 5 to 7   





     





     





     


 


Polyethylene Glycol  17 gm  10/18/18 09:00  10/19/18 08:36





  Miralax  PO   17 gm





  DAILY LINCOLN   Administration





     





     





     





     


 


Prochlorperazine Edisylate  10 mg  10/18/18 00:41  10/19/18 01:43





  Compazine Inj  IVP   10 mg





  Q6HR PRN   Administration





  Nausea / Vomiting   





     





     





     


 


Saccharomyces Boulardii  250 mg  10/18/18 08:00  10/19/18 17:02





  Florastor  PO   250 mg





  BIDWM LINCOLN   Administration





     





     





     





     


 


Sodium Chloride  10 ml  10/18/18 00:41  10/19/18 01:43





  Normal Saline Flush 0.9%  IVP   10 ml





  PRN PRN   Administration





  AS NEEDED PER PROVIDER ORDERS   





     





     





     


 


Sodium Chloride  10 ml  10/18/18 01:00  10/19/18 17:02





  Normal Saline Flush 0.9%  IVP   10 ml





  0100,0900,1700 LINCOLN   Administration





     





     





     





     














- Lab Result


Fish Bone Diagrams: 


                                 10/19/18 05:00





                                 10/19/18 05:00





Subjective





- Subjective


Patient Reports: Feeling Better, Resting Comfortably, No Complaints





Objective


Vital Signs: 





                               Vital Signs - 24 hr











  10/18/18 10/19/18 10/19/18





  21:34 00:16 07:39


 


Temperature  37.1 C 36.9 C


 


Heart Rate [  72 73





Radial]   


 


Respiratory  14 17





Rate   


 


Blood Pressure 114/66  


 


Blood Pressure  117/70 114/66





[Right Brachial   





artery]   


 


O2 Saturation  97 95














  10/19/18 10/19/18





  08:34 15:44


 


Temperature  37.5 C


 


Heart Rate [  75





Radial]  


 


Respiratory  18





Rate  


 


Blood Pressure 114/66 


 


Blood Pressure  130/71





[Right Brachial  





artery]  


 


O2 Saturation  96








                                     Oxygen











O2 Source                      Room air














I&O (Last 24 Hrs): 





                          Intake and Output Totals x24h











 10/17/18 10/18/18 10/19/18





 23:59 23:59 23:59


 


Intake Total  2713.000 1346


 


Output Total  0 


 


Balance  2713.000 1346











General: Alert, Oriented x3


HEENT: Mucous membr. moist/pink


Neck: Supple, No JVD


Neuro: Non Focal


Cardiovascular: Regular rate, No murmurs


Respiratory: No respiratory distress, Breath sounds nml


Abdomen: Normal bowel sounds, Soft, No tenderness


Extremities: No edema





- Results


Results: 





                               Laboratory Results











WBC  7.2 x10^3/uL (4.8-10.8)   10/19/18  05:00    


 


RBC  4.09 10^6/uL (4.20-5.40)  L  10/19/18  05:00    


 


Hgb  12.1 g/dL (12.0-16.0)   10/19/18  05:00    


 


Hct  35.9 % (37.0-47.0)  L  10/19/18  05:00    


 


MCV  87.7 fL (81.0-99.0)   10/19/18  05:00    


 


MCH  29.6 pg (27.0-31.0)   10/19/18  05:00    


 


MCHC  33.8 g/dL (32.0-36.0)   10/19/18  05:00    


 


RDW  13.6 % (12.0-15.0)   10/19/18  05:00    


 


Plt Count  153 10^3/uL (130-450)   10/19/18  05:00    


 


MPV  9.7 fL (7.9-10.8)   10/19/18  05:00    


 


Neut # (Auto)  5.9 10^3/uL (1.5-6.6)   10/19/18  05:00    


 


Lymph # (Auto)  0.6 10^3/uL (1.5-3.5)  L  10/19/18  05:00    


 


Mono # (Auto)  0.5 10^3/uL (0.0-1.0)   10/19/18  05:00    


 


Eos # (Auto)  0.1 10^3/uL (0.0-0.7)   10/19/18  05:00    


 


Baso # (Auto)  0.1 10^3/uL (0.0-0.1)   10/19/18  05:00    


 


Absolute Nucleated RBC  0.00 x10^3/uL  10/19/18  05:00    


 


Nucleated RBC %  0.1 /100WBC  10/19/18  05:00    


 


Sodium  139 mmol/L (135-145)   10/19/18  05:00    


 


Potassium  3.8 mmol/L (3.5-5.0)   10/19/18  05:00    


 


Chloride  109 mmol/L (101-111)   10/19/18  05:00    


 


Carbon Dioxide  23 mmol/L (21-32)   10/19/18  05:00    


 


Anion Gap  7.0  (6-13)   10/19/18  05:00    


 


BUN  14 mg/dL (6-20)   10/19/18  05:00    


 


Creatinine  0.5 mg/dL (0.4-1.0)   10/19/18  05:00    


 


Estimated GFR (MDRD)  126  (>89)   10/19/18  05:00    


 


Glucose  127 mg/dL ()  H  10/19/18  05:00    


 


Lactic Acid  0.8 mmol/L (0.5-2.2)   10/18/18  03:55    


 


Calcium  8.8 mg/dL (8.5-10.3)   10/19/18  05:00    


 


Total Bilirubin  0.3 mg/dL (0.2-1.0)   10/19/18  05:00    


 


AST  23 IU/L (10-42)   10/19/18  05:00    


 


ALT  27 IU/L (10-60)   10/19/18  05:00    


 


Alkaline Phosphatase  81 IU/L ()   10/19/18  05:00    


 


Total Protein  6.0 g/dL (6.7-8.2)  L  10/19/18  05:00    


 


Albumin  3.3 g/dL (3.2-5.5)   10/19/18  05:00    


 


Globulin  2.7 g/dL (2.1-4.2)   10/19/18  05:00    


 


Albumin/Globulin Ratio  1.2  (1.0-2.2)   10/19/18  05:00

## 2018-10-20 LAB
ALBUMIN DIAFP-MCNC: 3.3 G/DL (ref 3.2–5.5)
ALBUMIN/GLOB SERPL: 1.1 {RATIO} (ref 1–2.2)
ALP SERPL-CCNC: 84 IU/L (ref 42–121)
ALT SERPL W P-5'-P-CCNC: 25 IU/L (ref 10–60)
ANION GAP SERPL CALCULATED.4IONS-SCNC: 7 MMOL/L (ref 6–13)
AST SERPL W P-5'-P-CCNC: 20 IU/L (ref 10–42)
BASOPHILS NFR BLD AUTO: 0 10^3/UL (ref 0–0.1)
BASOPHILS NFR BLD AUTO: 0.3 %
BILIRUB BLD-MCNC: 0.5 MG/DL (ref 0.2–1)
BUN SERPL-MCNC: 15 MG/DL (ref 6–20)
CALCIUM UR-MCNC: 8.7 MG/DL (ref 8.5–10.3)
CHLORIDE SERPL-SCNC: 107 MMOL/L (ref 101–111)
CO2 SERPL-SCNC: 26 MMOL/L (ref 21–32)
CREAT SERPLBLD-SCNC: 0.6 MG/DL (ref 0.4–1)
EOSINOPHIL # BLD AUTO: 0.2 10^3/UL (ref 0–0.7)
EOSINOPHIL NFR BLD AUTO: 4.5 %
ERYTHROCYTE [DISTWIDTH] IN BLOOD BY AUTOMATED COUNT: 13.6 % (ref 12–15)
GFRSERPLBLD MDRD-ARVRAT: 102 ML/MIN/{1.73_M2} (ref 89–?)
GLOBULIN SER-MCNC: 2.9 G/DL (ref 2.1–4.2)
GLUCOSE SERPL-MCNC: 104 MG/DL (ref 70–100)
HGB UR QL STRIP: 12 G/DL (ref 12–16)
LYMPHOCYTES # SPEC AUTO: 0.9 10^3/UL (ref 1.5–3.5)
LYMPHOCYTES NFR BLD AUTO: 21.3 %
MCH RBC QN AUTO: 30.1 PG (ref 27–31)
MCHC RBC AUTO-ENTMCNC: 34.9 G/DL (ref 32–36)
MCV RBC AUTO: 86.1 FL (ref 81–99)
MONOCYTES # BLD AUTO: 0.4 10^3/UL (ref 0–1)
MONOCYTES NFR BLD AUTO: 9.7 %
NEUTROPHILS # BLD AUTO: 2.7 10^3/UL (ref 1.5–6.6)
NEUTROPHILS # SNV AUTO: 4.2 X10^3/UL (ref 4.8–10.8)
NEUTROPHILS NFR BLD AUTO: 64.2 %
PDW BLD AUTO: 9.3 FL (ref 7.9–10.8)
PLATELET # BLD: 158 10^3/UL (ref 130–450)
PROT SPEC-MCNC: 6.2 G/DL (ref 6.7–8.2)
RBC MAR: 4 10^6/UL (ref 4.2–5.4)
SODIUM SERPLBLD-SCNC: 140 MMOL/L (ref 135–145)

## 2018-10-20 RX ADMIN — POLYETHYLENE GLYCOL 3350 SCH: 17 POWDER, FOR SOLUTION ORAL at 11:45

## 2018-10-20 RX ADMIN — SODIUM CHLORIDE SCH MLS/HR: 900 INJECTION INTRAVENOUS at 01:00

## 2018-10-20 RX ADMIN — METOPROLOL TARTRATE SCH MG: 50 TABLET, FILM COATED ORAL at 10:48

## 2018-10-20 RX ADMIN — MELOXICAM SCH MG: 7.5 TABLET ORAL at 10:44

## 2018-10-20 RX ADMIN — ONDANSETRON PRN MG: 2 INJECTION INTRAMUSCULAR; INTRAVENOUS at 00:15

## 2018-10-20 RX ADMIN — Medication SCH MG: at 10:44

## 2018-10-20 RX ADMIN — SODIUM CHLORIDE, PRESERVATIVE FREE SCH ML: 5 INJECTION INTRAVENOUS at 10:45

## 2018-10-20 RX ADMIN — ENOXAPARIN SODIUM SCH MG: 100 INJECTION SUBCUTANEOUS at 10:49

## 2018-10-20 RX ADMIN — SULFAMETHOXAZOLE AND TRIMETHOPRIM SCH TAB: 800; 160 TABLET ORAL at 22:01

## 2018-10-20 RX ADMIN — SODIUM CHLORIDE, PRESERVATIVE FREE SCH ML: 5 INJECTION INTRAVENOUS at 23:38

## 2018-10-20 RX ADMIN — SODIUM CHLORIDE, PRESERVATIVE FREE SCH ML: 5 INJECTION INTRAVENOUS at 00:15

## 2018-10-20 RX ADMIN — FAMOTIDINE SCH MG: 20 TABLET, FILM COATED ORAL at 21:22

## 2018-10-20 RX ADMIN — Medication SCH MG: at 16:38

## 2018-10-20 RX ADMIN — FAMOTIDINE SCH MG: 20 TABLET, FILM COATED ORAL at 10:45

## 2018-10-20 RX ADMIN — METOPROLOL TARTRATE SCH MG: 50 TABLET, FILM COATED ORAL at 21:22

## 2018-10-20 RX ADMIN — SODIUM CHLORIDE, PRESERVATIVE FREE PRN ML: 5 INJECTION INTRAVENOUS at 01:00

## 2018-10-20 RX ADMIN — SODIUM CHLORIDE, PRESERVATIVE FREE SCH ML: 5 INJECTION INTRAVENOUS at 16:38

## 2018-10-20 RX ADMIN — ATORVASTATIN CALCIUM SCH MG: 40 TABLET, FILM COATED ORAL at 21:20

## 2018-10-20 NOTE — PROVIDER PROGRESS NOTE
Assessment/Plan





- Problem List


(1) Bacteremia due to Klebsiella pneumoniae


Assessment/Plan: 


Will stop iv Ceftriaxone and start po Bactrim DS bid, per sensitivities from 

blood cultures.


Will determine if she needs antiemetics with the Bactrim DS, to discharge her on

antiemetics.








(2) Urinary tract infection


Qualifiers: 


   Urinary tract infection type: acute pyelonephritis   Qualified Code(s): N10 -

Acute pyelonephritis   


Assessment/Plan: 


As in #1








(3) Hypertension


Qualifiers: 


   Hypertension type: essential hypertension   Qualified Code(s): I10 - 

Essential (primary) hypertension   


Assessment/Plan: 


Stable








(4) CARMELLA on CPAP


Assessment/Plan: 


Stable, home CPAP ordered to use.








(5) CAP (community acquired pneumonia)


Qualifiers: 


   Laterality: right 


Assessment/Plan: 


The CXR does continue to show bilateral opacities, consistent with an atypical 

pneumonia.


The po Bactrim DS will cover potential CAP from atypical organisms.








(6) Diarrhea due to drug


Assessment/Plan: 


New diarrhea since on antibiotics.


Will continue Florastor and add Imodium prn.








(7) N&V (nausea and vomiting)


Assessment/Plan: 


Resolved








- Current Meds


Current Meds: 





                               Current Medications











Generic Name Dose Route Start Last Admin





  Trade Name Freq  PRN Reason Stop Dose Admin


 


Acetaminophen  650 mg  10/18/18 00:41  10/19/18 15:39





  Tylenol  PO   650 mg





  Q4HR PRN   Administration





  Pain 1 to 4   





     





     





     


 


Atorvastatin Calcium  20 mg  10/18/18 21:00  10/19/18 21:28





  Lipitor  PO   20 mg





  QPM LINCOLN   Administration





     





     





     





     


 


Enoxaparin Sodium  40 mg  10/18/18 09:00  10/20/18 10:49





  Lovenox  SUBQ   40 mg





  DAILY LINCOLN   Administration





     





     





     





     


 


Famotidine  20 mg  10/18/18 09:00  10/20/18 10:45





  Pepcid  PO   20 mg





  BID LINCOLN   Administration





     





     





     





     


 


Ceftriaxone Sodium 2 gm/  100 mls @ 200 mls/hr  10/19/18 01:00  10/20/18 01:35





  Sodium Chloride  IV   Infused





  Q24H LINCOLN   Infusion





     





     





     





     


 


Meloxicam  7.5 mg  10/18/18 09:00  10/20/18 10:44





  Mobic  PO   7.5 mg





  DAILY LINCOLN   Administration





     





     





     





     


 


Metoprolol Tartrate  50 mg  10/18/18 21:00  10/19/18 21:27





  Lopressor  PO   50 mg





  2100 LINCOLN   Administration





     





     





     





     


 


Metoprolol Tartrate  100 mg  10/18/18 09:00  10/20/18 10:48





  Lopressor  PO   100 mg





  DAILY LINCOLN   Administration





     





     





     





     


 


Ondansetron HCl  4 mg  10/18/18 00:41  10/20/18 00:15





  Zofran Inj  IVP   4 mg





  Q6HR PRN   Administration





  Nausea / Vomiting   





     





     





     


 


Oxycodone HCl  5 mg  10/18/18 00:41  10/19/18 01:53





  Roxicodone  PO   5 mg





  Q4HR PRN   Administration





  Pain 5 to 7   





     





     





     


 


Polyethylene Glycol  17 gm  10/18/18 09:00  10/20/18 11:45





  Miralax  PO   Not Given





  DAILY LINCOLN   





     





     





     





     


 


Prochlorperazine Edisylate  10 mg  10/18/18 00:41  10/19/18 01:43





  Compazine Inj  IVP   10 mg





  Q6HR PRN   Administration





  Nausea / Vomiting   





     





     





     


 


Saccharomyces Boulardii  250 mg  10/18/18 08:00  10/20/18 10:44





  Florastor  PO   250 mg





  BIDWM LINCOLN   Administration





     





     





     





     


 


Sodium Chloride  10 ml  10/18/18 00:41  10/20/18 01:00





  Normal Saline Flush 0.9%  IVP   10 ml





  PRN PRN   Administration





  AS NEEDED PER PROVIDER ORDERS   





     





     





     


 


Sodium Chloride  10 ml  10/18/18 01:00  10/20/18 10:45





  Normal Saline Flush 0.9%  IVP   10 ml





  0100,0900,1700 LINCOLN   Administration





     





     





     





     


 


Zolpidem Tartrate  5 mg  10/18/18 00:41  10/19/18 22:56





  Ambien  PO   5 mg





  QPM PRN   Administration





  Insomnia   





     





     





     














- Lab Result


Fish Bone Diagrams: 


                                 10/20/18 05:20





                                 10/20/18 05:20





Subjective





- Subjective


Patient Reports: Feeling Better, Diarrhea





Objective


Vital Signs: 





                               Vital Signs - 24 hr











  10/19/18 10/19/18 10/20/18





  21:25 21:27 00:00


 


Temperature   36.6 C


 


Heart Rate [ 66  64





Radial]   


 


Respiratory   16





Rate   


 


Blood Pressure  123/64 


 


Blood Pressure 123/64  115/56 L





[Right Brachial   





artery]   


 


O2 Saturation 96  95














  10/20/18 10/20/18 10/20/18





  07:30 10:48 15:52


 


Temperature 36.9 C  37.0 C


 


Heart Rate [ 79  71





Radial]   


 


Respiratory 18  18





Rate   


 


Blood Pressure  110/60 


 


Blood Pressure 129/83 H  130/72





[Right Brachial   





artery]   


 


O2 Saturation 97  97








                                     Oxygen











O2 Source                      Room air














I&O (Last 24 Hrs): 





                          Intake and Output Totals x24h











 10/18/18 10/19/18 10/20/18





 23:59 23:59 23:59


 


Intake Total 2713.000 1346 1320


 


Output Total 0  


 


Balance 2713.000 1346 1320











General: Alert, Oriented x3


HEENT: Mucous membr. moist/pink


Neck: Supple, No JVD


Neuro: Non Focal


Cardiovascular: Regular rate


Respiratory: No respiratory distress


Abdomen: Soft, Other (Hyperactive BS)


Extremities: No edema





- Results


Results: 





                               Laboratory Results











WBC  4.2 x10^3/uL (4.8-10.8)  L  10/20/18  05:20    


 


RBC  4.00 10^6/uL (4.20-5.40)  L  10/20/18  05:20    


 


Hgb  12.0 g/dL (12.0-16.0)   10/20/18  05:20    


 


Hct  34.4 % (37.0-47.0)  L  10/20/18  05:20    


 


MCV  86.1 fL (81.0-99.0)   10/20/18  05:20    


 


MCH  30.1 pg (27.0-31.0)   10/20/18  05:20    


 


MCHC  34.9 g/dL (32.0-36.0)   10/20/18  05:20    


 


RDW  13.6 % (12.0-15.0)   10/20/18  05:20    


 


Plt Count  158 10^3/uL (130-450)   10/20/18  05:20    


 


MPV  9.3 fL (7.9-10.8)   10/20/18  05:20    


 


Neut # (Auto)  2.7 10^3/uL (1.5-6.6)   10/20/18  05:20    


 


Lymph # (Auto)  0.9 10^3/uL (1.5-3.5)  L  10/20/18  05:20    


 


Mono # (Auto)  0.4 10^3/uL (0.0-1.0)   10/20/18  05:20    


 


Eos # (Auto)  0.2 10^3/uL (0.0-0.7)   10/20/18  05:20    


 


Baso # (Auto)  0.0 10^3/uL (0.0-0.1)   10/20/18  05:20    


 


Absolute Nucleated RBC  0.00 x10^3/uL  10/20/18  05:20    


 


Nucleated RBC %  0.1 /100WBC  10/20/18  05:20    


 


Sodium  140 mmol/L (135-145)   10/20/18  05:20    


 


Potassium  3.5 mmol/L (3.5-5.0)   10/20/18  05:20    


 


Chloride  107 mmol/L (101-111)   10/20/18  05:20    


 


Carbon Dioxide  26 mmol/L (21-32)   10/20/18  05:20    


 


Anion Gap  7.0  (6-13)   10/20/18  05:20    


 


BUN  15 mg/dL (6-20)   10/20/18  05:20    


 


Creatinine  0.6 mg/dL (0.4-1.0)   10/20/18  05:20    


 


Estimated GFR (MDRD)  102  (>89)   10/20/18  05:20    


 


Glucose  104 mg/dL ()  H  10/20/18  05:20    


 


Lactic Acid  0.8 mmol/L (0.5-2.2)   10/18/18  03:55    


 


Calcium  8.7 mg/dL (8.5-10.3)   10/20/18  05:20    


 


Total Bilirubin  0.5 mg/dL (0.2-1.0)   10/20/18  05:20    


 


AST  20 IU/L (10-42)   10/20/18  05:20    


 


ALT  25 IU/L (10-60)   10/20/18  05:20    


 


Alkaline Phosphatase  84 IU/L ()   10/20/18  05:20    


 


Total Protein  6.2 g/dL (6.7-8.2)  L  10/20/18  05:20    


 


Albumin  3.3 g/dL (3.2-5.5)   10/20/18  05:20    


 


Globulin  2.9 g/dL (2.1-4.2)   10/20/18  05:20    


 


Albumin/Globulin Ratio  1.1  (1.0-2.2)   10/20/18  05:20    














ABX Reporting


Has patient been on IV antibiotics over the past 48 hours?: Yes

## 2018-10-20 NOTE — XRAY REPORT
Reason:  F/U pneumonia

Procedure Date:  10/20/2018   

Accession Number:  [id number] / [id number]                    

Procedure:  XR  - Chest 1 View X-Ray CPT Code:  87899

 

FULL RESULT:

 

 

EXAM:

CHEST RADIOGRAPHY

 

EXAM DATE: 10/20/2018 07:52 AM.

 

CLINICAL HISTORY: F/U pneumonia.

 

COMPARISON: CHEST 2 VIEW 10/17/2018 12:59 PM.

 

TECHNIQUE: 1 view.

 

FINDINGS:

Lungs/Pleura: Mild bibasilar hazy and reticular nodular opacities are 

similar to prior allowing for differences in technique. No new airspace 

opacity. No pneumothorax.

 

Mediastinum: Within exam limitations, the cardiomediastinal contour is 

normal.

 

Other: None.

 

IMPRESSION: No significant change from prior.

 

RADIA

## 2018-10-21 VITALS — SYSTOLIC BLOOD PRESSURE: 130 MMHG | DIASTOLIC BLOOD PRESSURE: 82 MMHG

## 2018-10-21 LAB
ALBUMIN DIAFP-MCNC: 3.6 G/DL (ref 3.2–5.5)
ALBUMIN/GLOB SERPL: 1.2 {RATIO} (ref 1–2.2)
ALP SERPL-CCNC: 92 IU/L (ref 42–121)
ALT SERPL W P-5'-P-CCNC: 35 IU/L (ref 10–60)
ANION GAP SERPL CALCULATED.4IONS-SCNC: 7 MMOL/L (ref 6–13)
AST SERPL W P-5'-P-CCNC: 29 IU/L (ref 10–42)
BASOPHILS NFR BLD AUTO: 0 10^3/UL (ref 0–0.1)
BASOPHILS NFR BLD AUTO: 0.3 %
BILIRUB BLD-MCNC: 0.8 MG/DL (ref 0.2–1)
BUN SERPL-MCNC: 16 MG/DL (ref 6–20)
CALCIUM UR-MCNC: 9.1 MG/DL (ref 8.5–10.3)
CHLORIDE SERPL-SCNC: 104 MMOL/L (ref 101–111)
CO2 SERPL-SCNC: 27 MMOL/L (ref 21–32)
CREAT SERPLBLD-SCNC: 0.6 MG/DL (ref 0.4–1)
EOSINOPHIL # BLD AUTO: 0.3 10^3/UL (ref 0–0.7)
EOSINOPHIL NFR BLD AUTO: 4.9 %
ERYTHROCYTE [DISTWIDTH] IN BLOOD BY AUTOMATED COUNT: 13.5 % (ref 12–15)
GFRSERPLBLD MDRD-ARVRAT: 102 ML/MIN/{1.73_M2} (ref 89–?)
GLOBULIN SER-MCNC: 2.9 G/DL (ref 2.1–4.2)
GLUCOSE SERPL-MCNC: 98 MG/DL (ref 70–100)
HGB UR QL STRIP: 12.7 G/DL (ref 12–16)
LYMPHOCYTES # SPEC AUTO: 1.3 10^3/UL (ref 1.5–3.5)
LYMPHOCYTES NFR BLD AUTO: 24.3 %
MCH RBC QN AUTO: 29.3 PG (ref 27–31)
MCHC RBC AUTO-ENTMCNC: 34.4 G/DL (ref 32–36)
MCV RBC AUTO: 85.1 FL (ref 81–99)
MONOCYTES # BLD AUTO: 0.4 10^3/UL (ref 0–1)
MONOCYTES NFR BLD AUTO: 7.5 %
NEUTROPHILS # BLD AUTO: 3.4 10^3/UL (ref 1.5–6.6)
NEUTROPHILS # SNV AUTO: 5.3 X10^3/UL (ref 4.8–10.8)
NEUTROPHILS NFR BLD AUTO: 63 %
PDW BLD AUTO: 9.1 FL (ref 7.9–10.8)
PLATELET # BLD: 200 10^3/UL (ref 130–450)
PROT SPEC-MCNC: 6.5 G/DL (ref 6.7–8.2)
RBC MAR: 4.33 10^6/UL (ref 4.2–5.4)
SODIUM SERPLBLD-SCNC: 138 MMOL/L (ref 135–145)

## 2018-10-21 RX ADMIN — FAMOTIDINE SCH MG: 20 TABLET, FILM COATED ORAL at 08:32

## 2018-10-21 RX ADMIN — MELOXICAM SCH MG: 7.5 TABLET ORAL at 08:32

## 2018-10-21 RX ADMIN — SODIUM CHLORIDE, PRESERVATIVE FREE SCH ML: 5 INJECTION INTRAVENOUS at 08:35

## 2018-10-21 RX ADMIN — SULFAMETHOXAZOLE AND TRIMETHOPRIM SCH TAB: 800; 160 TABLET ORAL at 08:32

## 2018-10-21 RX ADMIN — Medication SCH MG: at 08:31

## 2018-10-21 RX ADMIN — ENOXAPARIN SODIUM SCH MG: 100 INJECTION SUBCUTANEOUS at 08:33

## 2018-10-21 RX ADMIN — POLYETHYLENE GLYCOL 3350 SCH: 17 POWDER, FOR SOLUTION ORAL at 08:33

## 2018-10-21 RX ADMIN — METOPROLOL TARTRATE SCH MG: 50 TABLET, FILM COATED ORAL at 08:31

## 2018-10-21 NOTE — CT REPORT
Reason:  R flank pain, UTI, Hx of kidney stone

Procedure Date:  10/21/2018   

Accession Number:  [id number] / [id number]                    

Procedure:  CT  - Abdomen/Pelvis W/O CPT Code:  

 

FULL RESULT:

 

 

EXAM:

CT ABDOMEN AND PELVIS

 

EXAM DATE: 10/21/2018 12:32 PM.

 

CLINICAL HISTORY: R flank pain, UTI, Hx of kidney stone.

 

COMPARISONS: None.

 

TECHNIQUE: Routine helical CT imaging was performed through the abdomen 

and pelvis. IV contrast: None. Enteric contrast: No. Reconstructions: 

Coronal and sagittal.

 

In accordance with CT protocol optimization, one or more of the following 

dose reduction techniques were utilized for this exam: automated exposure 

control, adjustment of mA and/or KV based on patient size, or use of 

iterative reconstructive technique.

 

FINDINGS:

Lung Bases: There are multiple areas of linear opacity in the lung bases, 

compatible with atelectasis or scar. Trace right pleural effusion is 

present.

 

Liver: Subcentimeter hypoattenuating foci are present in segments 7 and 

4A, too small to characterize. Otherwise unremarkable.

 

Gallbladder/Bile Ducts: Small intraluminal calcifications present in the 

gallbladder, suggestive of stones. No gallbladder wall thickening. Bile 

ducts are normal in caliber.

 

Spleen: Within normal limits.

 

Pancreas: Unremarkable.

 

Adrenal Glands: No nodules.

 

Kidneys: There is fairly diffuse, faintly increased attenuation in the 

medullary portions of both kidneys. There is a calculus in the 

infundibulum of the right upper pole, measuring 15 mm (series 3, image 

40), which appears to result in focal calyceal dilation in the upper pole 

(series 3, image 39). On the left, there is a 5 mm calculus at the 

ureteropelvic junction (series 3, image 40), which does not result in 

significant hydronephrosis. Numerous additional punctate nonobstructing 

calculi present bilaterally. There is a cyst in the lower pole of the 

right kidney.

 

Peritoneal Cavity/Bowel: There is no evidence of bowel obstruction or 

inflammation. Mild diverticulosis of the sigmoid colon without evidence 

for acute diverticulitis.

 

Pelvic Organs: Urinary bladder is decompressed. No bladder calculi 

demonstrated. Uterus and adnexal structures are unremarkable. No pelvic 

adenopathy or free fluid.

 

Vasculature: No abdominal aortic aneurysm.

 

Bones: There is an old fracture of the right anterior sixth rib. No 

suspicious osseous lesion.

 

Other: No retroperitoneal adenopathy.

IMPRESSION:

1. Subtle, increased density of the medullary portions of bilateral 

kidneys, raising the possibility of medullary nephrocalcinosis.

2. A 15 mm calculus in the infundibulum of the right upper pole calyces 

results in focal upper pole calyceal dilation, suggestive of obstruction 

at the level of the infundibulum. No global hydronephrosis demonstrated.

3. A 5 mm calculus at the left ureteropelvic junction (UPJ) does not 

result in significant hydronephrosis.

4. Numerous additional punctate nonobstructing renal calculi present 

bilaterally.

5. Cholelithiasis.

 

RADIA

## 2018-10-21 NOTE — DISCHARGE PLAN
Discharge Plan


Disposition: 01 Home, Self Care


Condition: Stable


Prescriptions: 


Loperamide [Imodium] 2 mg PO QID PRN #10 capsule


 PRN Reason: Diarrhea


Saccharomyces Boulardii [Florastor] 250 mg PO BID 10 Days #20 capsule


Sulfamethox/Trimeth 800/160 [Bactrim Ds] 1 tab PO BID 10 Days #20 tablet


Diet: Regular


Activity Restrictions: Activity as Tolerated


Shower Restrictions: No


Driving Restrictions: No


Instruction Topics:  Sulfamethoxazole Trimethoprim SMX-TMP tablets, Urinary 

Tract Infecs Women, Sepsis Dc, Pneumonia Dc, ED Kidney Infec Female


Additional Instructions or Follow Up instructions: 





You were admitted with a urinary tract infection that had spread into your blood

stream. You are being sent home with antibiotics to finish 10 days more of 

treatment. There is also a medication to take as needed for diarrhea and pill 

for bowel health.





The CT scan did show multiple kidney stones, the largest on the right, which may

explain the R-back pain. You should see a Urologist for this. You may need to 

stop the water pill, which could be adding to stone formation; check with your 

PCP. 





Resume all your other pre-hospital medications.





Please see your PCP in 5-7 days in follow-up.





If you have knew or worsening symptoms, return to the ER.





No Smoking: If you smoke, Please STOP!  Call [phone] for help.


Follow-up with: 


Ayanna Das PA [Primary Care Provider] -

## 2018-10-25 NOTE — DISCHARGE SUMMARY
Physician: Eunice Milligan MD

DATE OF ADMISSION: 10/18/2018

DATE OF DISCHARGE:  10/21/2018

 

HISTORY OF PRESENT ILLNESS:  This is a 58-year-old white female with a history 
of hypertension, hyperlipidemia, sleep apnea; on CPAP, kidney stones, who 
presented to the emergency room the day before this admission with complaints of
nausea, vomiting, chills and fever.  She was felt to have a urinary tract 
infection and was discharged to start antibiotics.  Her blood cultures turned 
positive and she was called to return and be admitted to the hospital for 
treatment with iv antibiotics. 

HOSPITAL COURSE AND DISCHARGE DIAGNOSES

1.  Bacteremia due to Klebsiella pneumoniae.  The blood culture grew Klebsiella 
as well as her urine culture.  Her empirically chosen IV antibiotics were 
adjusted based on the sensitivities.  Because of bacteremia, she was discharged 
to complete a 14-day total course of treatment using oral Bactrim DS for 10 more
days.

2.  Acute pyelonephritis.  The patient's urine grew the same species.  She had 
dysuria early in the course, which improved with antibiotics.  She received IV 
hydration briefly.

3.  Hypertension.  The patient was kept on her blood pressure medications while 
here.

4.  Obstructive sleep apnea, on CPAP.  The patient was kept on her home CPAP 
unit while here.

5.  Community-acquired pneumonia.  Because of the patient's admitting complaints
suggesting infection, she had a chest x-ray on presentation, which was read as 
having mild bibasilar reticular nodular opacities, which were felt to be 
consistent with an atypical pneumonia.  The Bactrim DS treatment should cover 
atypical organisms.  She never produced a sputum culture. 

6.  Diarrhea due to drug.  On her final day here, she developed loose stools.  
She was started on symptomatic treatment and advised to stay well hydrated.

7.  Kidney stones.  On her final day of hospitalization, she also complained of 
right-sided flank pain.  She underwent a CT of the abdomen and pelvis that 
showed multiple bilateral kidney stones, the largest was in the right upper 
kidney resulting in focal upper pole calyceal dilation suggestive of obstruction
at the level of the infundibulum.  There was no global hydronephrosis.  The 
patient has had previous Urology evaluation and knows that she is due for 
Urology followup.  She was given a copy of this CT report to take to her 
Urologist.

 

ALLERGIES:  ADHESIVE TAPE AND CEFTRIAXONE.

 

MEDICATIONS AT TIME OF DISCHARGE

1.  ProAir inhaler p.r.n.

2.  Lipitor 20 mg q.p.m.

3.  HCTZ 12.5 mg daily (She was advised to check if this should be continued 
because of the recurrent kidney stones).

4.  Mobic 15 mg daily.

5.  Toprol- daily and 50 mg q.p.m.

6.  Zoloft 50 mg daily.

7.  Imodium p.r.n.

8.  Florastor 250 mg b.i.d. for 10 days.

9.  Trimethoprim sulfa 800/160 p.o. b.i.d. for 10 more days.

 

LABORATORY AND IMAGING:  Reviewed and summarized above.

 

CONDITION AT DISCHARGE:  Fair.

 

PHYSICAL EXAMINATION

VITAL SIGNS:  Blood pressure 130/82, heart rate 71, sinus rhythm, afebrile, room
air saturation 93%.

HEENT:  Unremarkable.

NECK:  Without JVD or carotid bruits.

CHEST:  Clear.

HEART:  Heart sounds are normal with no murmur.

ABDOMEN:  Soft, nontender, benign.  The right flank and left flank have no 
tenderness.  

EXTREMITIES:  Without edema.

NEUROLOGIC:  Intact.

 

FOLLOWUP:  She was advised to see her PCP in 5-7 days and to have a urology 
appointment.

 

CODE STATUS:  FULL CODE.

 

Time required to complete this entire discharge, chart review, patient 
education, prescription orders, dictation:  60 minutes.

 

 

cc: LISSA Brunson

DD: 10/24/2018 22:25

TD: 10/24/2018 22:33

Job #: [id number]

MTDD

## 2022-01-03 ENCOUNTER — HOSPITAL ENCOUNTER (OUTPATIENT)
Dept: HOSPITAL 76 - SC | Age: 62
Discharge: HOME | End: 2022-01-03
Attending: INTERNAL MEDICINE
Payer: COMMERCIAL

## 2022-01-03 VITALS — SYSTOLIC BLOOD PRESSURE: 134 MMHG | DIASTOLIC BLOOD PRESSURE: 80 MMHG

## 2022-01-03 DIAGNOSIS — G47.33: Primary | ICD-10-CM

## 2022-01-03 DIAGNOSIS — E66.9: ICD-10-CM

## 2022-01-03 PROCEDURE — 99212 OFFICE O/P EST SF 10 MIN: CPT

## 2022-01-03 PROCEDURE — 99202 OFFICE O/P NEW SF 15 MIN: CPT

## 2022-01-03 NOTE — SLEEP CARE CONSULTATION
Information from patient questionnaire entered by Jerzy Cabello MA.





I have reviewed and concur with the information entered by Jerzy Cabello MA. 

This document represents the service I personally performed and the decisions 

made by me, Christiano Castro MD, [facility].





History of Present Illness


Service Date and Time: 01/03/2022    0921


Reason for Visit: New patient (INITIAL, LAST SEEN 2015, CPAP BROKE,), Previously

diagnosed sleep apnea


Additional HPI information: 


I have the pleasure of seeing Ms. Schroeder today regarding obstructive sleep 

apnea.  As you know, she is a 60 year old lady who had a sleep study here 7 

years ago that showed mild obstructive sleep apnea-hypopnea (AHI was 5.3 and 

flores oxygen saturation, 85%).  She was prescribed a CPAP set at 7 cmH2O.  She 

used the ResMed AirSense intermittently until 3 weeks ago when it broke.  She 

brought the ResMed AirSense 10 in and it appears that on/off button is stuck in 

on position.  She wears the ResMed AirFit P-10 nasal pillows.  Her durable 

medical supplier is [employer].  The compliance data show usage in 83 out of the 

past 180 nights, averaging 5.1 hours a night.  The > 4 hour compliance rate for 

the past 30 days is 24%.  The residual AHI is 0.7 and average air leak is 0.9 

L/minute.  She says that she feels a lot better using the CPAP.  However, she 

still snores through the CPAP.








Subjective


Current [place] Sleepiness Scale score: 7





Past Medical History


Past Medical History: reports: Hypertension, Anxiety, Depression, Other (kidney 

stones)





Social History


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 Patient is  and lives in . 





Have you smoked in the past 12 months: No


Alcohol use: No


Caffeine use: Yes


Caffeine amount and frequency: 2-3 cups a day





Family History


Family Hx Sleep Apnea: Father: Snoring





Allergies and Home Medications


Drug allergies reviewed: Yes


Home medication list reviewed: Yes





Review of Systems


Cardiovascular: reports: high blood pressure


Respiratory: denies: shortness of breath, wheeze, sputum production, chronic 

cough, other


Gastrointestinal: denies: heartburn, difficulty swallowing, nausea, vomitting, 

diarrhea, abdominal pain, other


Urinary: reports: other (kidney stones)


Neurological: denies: headaches, seizure, head trauma, disorientation, speech 

dysfunction, gait or balance problems, fainting or unconsciousness, other


Psychiatric: reports: anxiety, depression


Ear/Nose/Throat: denies: nasal congestion, sinus problems, nose bleeds, dry 

mouth/throat, hoarseness, injury to nose, tonsillectomy, wisdom teeth removed, 

other


Endocrine: denies: thyroid disease, history of goiter, sluggishness, too hot or 

cold, excessive thirst, increased appetite, increased urination, unexplained 

weakness, other


Musculoskeletal: denies: joint pain, neck pain, back pain, joint swelling, 

muscle pain or cramping, mobility problems, other


Immunologic: denies: sneezing, rash, itching, allergies to food or environment, 

other





Physical Exam


Vital signs obtained and entered by: NYDIA RAMESH


Blood Pressure: 134/80


Cuff size: regular


Heart Rate: 71


O2 Saturation: 93


Height: 5 ft 5 in


Weight: 210 lb


Body Mass Index: 34.9


BMI Classification: Obese


Mood/affect: normal





Impression and Plan


IMPRESSION: 1. Obstructive Sleep Apnea-Hypopnea Syndrome, mild, previously 

diagnosed.  The patient has poor compliance despite reporting clear improvement.

 The pressure is slightly ineffective because she still snores through the CPAP.

 Because the CPAP is broken and now older than the useful life of 5 years, I 

will order the patient a new one and make it an autoCPAP set between 7 and 12 

cmH2O.  





Plan:  1. Her CPAP set to 9 cmH2O manually.


2. Prescription made for an autoCPAP, heated humidifier, and related supplies. 


3. Avoid alcohol, sedative and muscle relaxant around bedtime.


4. Attempt to lose weight.


5. Return for follow up after one month of using the CPAP.





Counseling Topics: Weight loss health impact


Prescriptions: Auto CPAP


Follow up with Sleep Care in: 1-2 months


Visit Type: In Office


Time Spent with Patient (minutes): 15


Provider Statement: I spent 100% of the Face to Face Visit with the patient with

greater than 50% spent counseling the patient and coordination of care.

## 2022-02-25 ENCOUNTER — HOSPITAL ENCOUNTER (EMERGENCY)
Dept: HOSPITAL 76 - ED | Age: 62
Discharge: HOME | End: 2022-02-25
Payer: COMMERCIAL

## 2022-02-25 VITALS — DIASTOLIC BLOOD PRESSURE: 83 MMHG | SYSTOLIC BLOOD PRESSURE: 138 MMHG

## 2022-02-25 DIAGNOSIS — I10: ICD-10-CM

## 2022-02-25 DIAGNOSIS — T78.2XXA: Primary | ICD-10-CM

## 2022-02-25 PROCEDURE — 99282 EMERGENCY DEPT VISIT SF MDM: CPT

## 2022-02-25 PROCEDURE — 96372 THER/PROPH/DIAG INJ SC/IM: CPT

## 2022-02-25 PROCEDURE — 99283 EMERGENCY DEPT VISIT LOW MDM: CPT

## 2022-02-25 NOTE — ED PHYSICIAN DOCUMENTATION
History of Present Illness





- Stated complaint


Stated Complaint: BODY RASH





- Chief complaint


Chief Complaint: Allergic Rx





- History obtained from


History obtained from: Patient





- Additonal information


Additional information: 





61-year-old woman developed mouth sores and hives as well as severe diffuse 

itching with some stomach upset and nausea and today developed conjunctivitis.  

This was soft starting about 48 h ago after eating a hamburger at a fast food 

place.  No history of severe allergies but she is allergic to tape.





Review of Systems


Constitutional: reports: Reviewed and negative


Ears: reports: Reviewed and negative


Nose: reports: Reviewed and negative


Throat: reports: Reviewed and negative


Cardiac: reports: Reviewed and negative


Respiratory: reports: Reviewed and negative





PD PAST MEDICAL HISTORY





- Past Medical History


Cardiovascular: Hypertension, High cholesterol, Other


Respiratory: Pneumonia, CPAP use


Neuro: None


Endocrine/Autoimmune: None


GI: GERD, Other


: Chronic bladder infection, Kidney stones


Psych: Depression, Anxiety


Musculoskeletal: Chronic back pain


Derm: None





- Past Surgical History


Past Surgical History: Yes


General: Appendectomy


/GYN:  section, Tubal ligation





- Present Medications


Home Medications: 


                                Ambulatory Orders











 Medication  Instructions  Recorded  Confirmed


 


Albuterol Sulfate [Proair Hfa 2 puffs INH Q6H PRN 10/18/18 10/18/18





Inhaler]   


 


Atorvastatin Calcium 20 mg PO QPM 10/18/18 10/18/18


 


Meloxicam [Mobic] 15 mg PO DAILY 10/18/18 10/18/18


 


Metoprolol Succinate [Toprol Xl] 50 mg PO QPM 10/18/18 10/18/18


 


Metoprolol Succinate [Toprol Xl] 100 mg PO DAILY 10/18/18 10/18/18


 


Sertraline [Zoloft] 50 mg PO DAILY 10/18/18 10/18/18


 


hydroCHLOROthiazide [Hydrodiuril] 12.5 mg PO DAILY 10/18/18 10/18/18


 


Loperamide [Imodium] 2 mg PO QID PRN #10 capsule 10/21/18 


 


Saccharomyces Boulardii [Florastor] 250 mg PO BID 10 Days #20 capsule 10/21/18 


 


Sulfamethox/Trimeth 800/160 1 tab PO BID 10 Days #20 tablet 10/21/18 





[Bactrim Ds]   


 


Doxepin [SINEquan] 10 mg PO TID PRN #20 cap 22 


 


Loratadine [Claritin] 10 mg PO DAILY #30 cap 22 


 


predniSONE [Deltasone] 60 mg PO DAILY 5 Days #15 tablet 22 














- Allergies


Allergies/Adverse Reactions: 


                                    Allergies











Allergy/AdvReac Type Severity Reaction Status Date / Time


 


adhesive Allergy  Hives Verified 22 09:08


 


ceftriaxone AdvReac  Nausea Verified 22 09:08














- Social History


Does the pt smoke?: No


Smoking Status: Never smoker


Does the pt drink ETOH?: No


Does the pt have substance abuse?: No





- Immunizations


Immunizations are current?: Yes





- POLST


Patient has POLST: No


POLST Status: Full Code





PD ED PE NORMAL





- Vitals


Vital signs reviewed: Yes





- General


General: Alert and oriented X 3, No acute distress





- HEENT


HEENT: Other (Mild allergic conjunctivitis, no oral lesions or angioedema at th

is juncture.)





- Neck


Neck: Supple, no meningeal sign, No bony TTP





- Cardiac


Cardiac: RRR, No murmur





- Respiratory


Respiratory: No respiratory distress, Clear bilaterally





- Abdomen


Abdomen: Non tender





- Derm


Derm: Other (Diffuse body wide hives)





- Neuro


Neuro: Alert and oriented X 3, Normal speech





Results





- Vitals


Vitals: 





                               Vital Signs - 24 hr











  22





  09:04


 


Temperature 36.5 C


 


Heart Rate 113 H


 


Respiratory 18





Rate 


 


Blood Pressure 153/85 H


 


O2 Saturation 97








                                     Oxygen











O2 Source                      Room air

















PD MEDICAL DECISION MAKING





- ED course


ED course: 





61-year-old woman presents with mild anaphylaxis that is 48 h old.  She is 

administered epinephrine, steroids and Claritin here.  No antihistamines othe

rwise as she is driving.





Departure





- Departure


Disposition: 01 Home, Self Care


Clinical Impression: 


Anaphylactic reaction


Qualifiers:


 Encounter type: initial encounter Qualified Code(s): T78.2XXA - Anaphylactic 

shock, unspecified, initial encounter





Condition: Good


Record reviewed to determine appropriate education?: Yes


Instructions:  ED Allergic Reaction General Other


Prescriptions: 


Loratadine [Claritin] 10 mg PO DAILY #30 cap


predniSONE [Deltasone] 60 mg PO DAILY 5 Days #15 tablet


Doxepin [SINEquan] 10 mg PO TID PRN #20 cap


 PRN Reason: Itching


Comments: 


I sent your prescriptions electronically to [facility] in [place].





Return for new or worsening symptoms.





Do not drink or drive while taking prescription antiitch medication, doxepin.





If this becomes a recurrent issue follow-up with an allergist for consideration 

for allergy testing.





Follow-up with your primary care physician regardless.

## 2022-05-19 ENCOUNTER — HOSPITAL ENCOUNTER (OUTPATIENT)
Dept: HOSPITAL 76 - EMS | Age: 62
Discharge: ANOTHER HOSPITAL | End: 2022-05-19
Payer: COMMERCIAL

## 2022-05-19 DIAGNOSIS — R07.1: ICD-10-CM
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
## 2022-05-19 DIAGNOSIS — R07.81: Primary | ICD-10-CM
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
## 2022-05-19 DIAGNOSIS — M25.512: ICD-10-CM

## 2024-09-09 ENCOUNTER — HOSPITAL ENCOUNTER (OUTPATIENT)
Dept: HOSPITAL 76 - SC | Age: 64
Discharge: HOME | End: 2024-09-09
Attending: INTERNAL MEDICINE
Payer: MEDICARE

## 2024-09-09 VITALS — DIASTOLIC BLOOD PRESSURE: 80 MMHG | SYSTOLIC BLOOD PRESSURE: 137 MMHG | OXYGEN SATURATION: 97 %

## 2024-09-09 DIAGNOSIS — G47.33: Primary | ICD-10-CM

## 2024-09-09 DIAGNOSIS — E66.9: ICD-10-CM

## 2024-09-09 PROCEDURE — 99212 OFFICE O/P EST SF 10 MIN: CPT

## 2024-09-09 NOTE — SLEEP CARE CONSULTATION
Information from patient questionnaire entered by Espinoza Guadarrama.





I have reviewed and concur with the information entered by Espinoza Guadarrama. This 

document represents the service I personally performed and the decisions made by

me, Christiano Castro MD, [facility].





History of Present Illness


Service Date and Time: 09/09/2024    0852


Reason for follow up: annual (LAST SEEN 01/22/24)


Equipment type: CPAP


HPI additional information: 


Ms. Schroeder returned today for follow up of nasal CPAP therapy.  She was 

diagnosed with mild obstructive sleep apnea-hypopnea syndrome.  The patient went

to [facility] for the equipment and was fitted with nasal pillows.  She reports 

using the ResMed AirSense 11 nightly and all through the night. She is renting 

this machine out of pocket because her original machine broke 2 years ago.  The 

compliance report shows usage in 291 nights out of the past 365 nights, 

averaging 7.1 hours a night.  The > 4 hour compliance rate for the past 30 days 

is 72%. She complained of no particular problem with the device such as soreness

on the face, dry nose, epistaxis, nasal congestion or headache.  She thinks that

the pressure of 7 - 12 cmH2O is comfortable.  On the CPAP therapy she notices 

improvement in her sleep quality, and that she wakes up feeling fresher in the 

morning and more awake/alert during the day.  The [place] Sleepiness Scale score

5  Her  notices no snore at all.  The average residual AHI is 0.4; and 

air leak, 4.2 L/min.  The 90th percentile pressure is 11.4 cmH2O.








CPAP Compliance Data





- Data Reviewed with Patient


Average duration of nightly device use: 7HRS 5MINS


Compliance rate %: 72 (09/07/23-09/05/24)


Current pressure setting (cmH2O): 7-12


Average residual AHI: 0.4





Subjective


Current [place] Sleepiness Scale score: 5 (09/09/24)





Allergies and Home Medications


Drug allergies reviewed: Yes


Home medication list reviewed: Yes


Allergy and home medication list: 


Allergies





adhesive Allergy (Verified 09/09/24 09:26)


   Hives


ceftriaxone Adverse Reaction (Verified 09/09/24 09:26)


   Nausea











Review of Systems


Review of systems same as previous: Yes (NO CHANGE)





Physical Exam


Vital signs obtained and entered by: ESPINOZA YANEZ MA


Blood Pressure: 137/80 (RIGHT ARM)


Cuff size: regular


Heart Rate: 62


O2 Saturation: 97


Height: 5 ft 5.5 in


Weight: 227 lb 12.8 oz


Body Mass Index: 37.3


BMI Classification: Obese





Impression and Plan


IMPRESSION: 1. Obstructive Sleep Apnea-Hypopnea Syndrome, mild (AHI = 5.3) with 

the patient doing well on nasal CPAP therapy.  She has excellent compliance and 

significant clinical improvement.  The current pressure appears effective and 

comfortable.  Overall, she is very satisfied with treatment, and plans to 

continue with it long-term.  Because her original CPAP is now older than the 

useful life of 5 years, I will order the patient a new one and make it an 

autoCPAP set between 5 and 10 cmH2O (slightly lower pressure to help minimize 

air leak). 





PLAN:     1.   Continue with nasal CPAP therapy with 7 - 12 cmH2O.


2.   Prescription made for an autoCPAP, heated humidifier, and related supplies.


3.   Try other masks and nasal pillows.


4.   Return for follow up after one month of using the CPAP. 





Counseling Topics: Weight control


Prescriptions: Auto CPAP


Follow up with Sleep Care in: 1-2 months


Visit Type: In Office


Time Spent with Patient (minutes): 15


Provider Statement: I spent 100% of the Face to Face Visit with the patient with

greater than 50% spent counseling the patient and coordination of care.